# Patient Record
Sex: FEMALE | URBAN - METROPOLITAN AREA
[De-identification: names, ages, dates, MRNs, and addresses within clinical notes are randomized per-mention and may not be internally consistent; named-entity substitution may affect disease eponyms.]

---

## 2024-02-25 SDOH — ECONOMIC STABILITY: TRANSPORTATION INSECURITY
IN THE PAST 12 MONTHS, HAS LACK OF TRANSPORTATION KEPT YOU FROM MEETINGS, WORK, OR FROM GETTING THINGS NEEDED FOR DAILY LIVING?: NO

## 2024-02-25 SDOH — ECONOMIC STABILITY: FOOD INSECURITY: WITHIN THE PAST 12 MONTHS, THE FOOD YOU BOUGHT JUST DIDN'T LAST AND YOU DIDN'T HAVE MONEY TO GET MORE.: NEVER TRUE

## 2024-02-25 SDOH — ECONOMIC STABILITY: HOUSING INSECURITY
IN THE LAST 12 MONTHS, WAS THERE A TIME WHEN YOU DID NOT HAVE A STEADY PLACE TO SLEEP OR SLEPT IN A SHELTER (INCLUDING NOW)?: NO

## 2024-02-25 SDOH — ECONOMIC STABILITY: FOOD INSECURITY: WITHIN THE PAST 12 MONTHS, YOU WORRIED THAT YOUR FOOD WOULD RUN OUT BEFORE YOU GOT MONEY TO BUY MORE.: NEVER TRUE

## 2024-02-25 SDOH — ECONOMIC STABILITY: INCOME INSECURITY: HOW HARD IS IT FOR YOU TO PAY FOR THE VERY BASICS LIKE FOOD, HOUSING, MEDICAL CARE, AND HEATING?: NOT HARD AT ALL

## 2024-02-25 NOTE — PROGRESS NOTES
Annual Exam    Chief Complaint   Patient presents with    New Patient     Maximus Mitchell is a 24 y.o. presenting for annual exam. She has multiple concerns today. She notes that she previously tested positive for gonorrhea (however unclear specimen- repeat testing prior to treatment was negative and partner testing also negative). She notes that she had vaginal discharge at that time however no itching or odor.    She also has concerns about PCOS. She notes that she has always had irregular cycles. She currently has a Kyleena IUD in place (placed 2/2020). She notes that for the first 3 years of her Kyleena she had minimal bleeding and no cramping. She has starting to have more break through bleeding and in the past 2 months, an increase in cramping.      She does have a history of thyroid disorder and is managed on Synthroid.    She does have a family history significant for cervical cancer. Her mother was diagnosed with cervical cancer in her 20s. She underwent chemotherapy and radiation therapy. She has since been doing well. She has insisted that Maximus get pap smears (Maximus reports pap smears yearly since she was 10 years old).    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 She is engaged. She and her partner are getting  this summer then going on a honeymoon to [facility]. They are interested in having children but likely not for another 5 or so years.    She declines a chaperone during the gynecologic exam today.     Ob/Gyn Hx:  G0  LMP - 02/26/2024  Menses - irregular with Kyleena in place   Contraception - Kyleena- inserted 02/2020-  STI - gonorrhea. One provider told her she was positive and one provider told her she was negative. Partner tested negative.    SA - Yes     Health maintenance:  Pap - 2023- normal per patient. Patient notes she has been having pap smears since she was 10.  Gardasil - series completed.    Past Medical History:   Diagnosis Date    Encounter for IUD insertion 02/2020    Mary

## 2024-02-27 ENCOUNTER — OFFICE VISIT (OUTPATIENT)
Age: 24
End: 2024-02-27
Payer: COMMERCIAL

## 2024-02-27 VITALS
SYSTOLIC BLOOD PRESSURE: 106 MMHG | BODY MASS INDEX: 24.17 KG/M2 | DIASTOLIC BLOOD PRESSURE: 64 MMHG | HEIGHT: 61 IN | WEIGHT: 128 LBS

## 2024-02-27 DIAGNOSIS — Z11.3 SCREENING FOR STD (SEXUALLY TRANSMITTED DISEASE): ICD-10-CM

## 2024-02-27 DIAGNOSIS — Z01.419 ENCOUNTER FOR GYNECOLOGICAL EXAMINATION WITHOUT ABNORMAL FINDING: Primary | ICD-10-CM

## 2024-02-27 DIAGNOSIS — E28.2 PCOS (POLYCYSTIC OVARIAN SYNDROME): ICD-10-CM

## 2024-02-27 DIAGNOSIS — N93.9 ABNORMAL UTERINE BLEEDING (AUB): ICD-10-CM

## 2024-02-27 LAB
EST. AVERAGE GLUCOSE BLD GHB EST-MCNC: 82 MG/DL
HBA1C MFR BLD: 4.5 % (ref 4–5.6)
PROLACTIN SERPL-MCNC: 15.1 NG/ML

## 2024-02-27 PROCEDURE — 99385 PREV VISIT NEW AGE 18-39: CPT | Performed by: OBSTETRICS & GYNECOLOGY

## 2024-02-27 PROCEDURE — 99203 OFFICE O/P NEW LOW 30 MIN: CPT | Performed by: OBSTETRICS & GYNECOLOGY

## 2024-02-29 LAB — DHEA-S SERPL-MCNC: 204 UG/DL (ref 110–431.7)

## 2024-03-02 LAB
TESTOST FREE SERPL-MCNC: 3.1 PG/ML (ref 0–4.2)
TESTOST SERPL-MCNC: 38.8 NG/DL (ref 10–55)

## 2024-03-04 LAB
., LABCORP: NORMAL
C TRACH RRNA CVX QL NAA+PROBE: NEGATIVE
CYTOLOGIST CVX/VAG CYTO: NORMAL
CYTOLOGY CVX/VAG DOC CYTO: NORMAL
CYTOLOGY CVX/VAG DOC THIN PREP: NORMAL
DX ICD CODE: NORMAL
Lab: NORMAL
Lab: NORMAL
N GONORRHOEA RRNA CVX QL NAA+PROBE: NEGATIVE
OTHER STN SPEC: NORMAL
STAT OF ADQ CVX/VAG CYTO-IMP: NORMAL
T VAGINALIS RRNA SPEC QL NAA+PROBE: NEGATIVE